# Patient Record
Sex: MALE | Race: WHITE | HISPANIC OR LATINO | Employment: FULL TIME | URBAN - METROPOLITAN AREA
[De-identification: names, ages, dates, MRNs, and addresses within clinical notes are randomized per-mention and may not be internally consistent; named-entity substitution may affect disease eponyms.]

---

## 2019-04-14 ENCOUNTER — OFFICE VISIT (OUTPATIENT)
Dept: URGENT CARE | Facility: PHYSICIAN GROUP | Age: 44
End: 2019-04-14
Payer: COMMERCIAL

## 2019-04-14 VITALS
WEIGHT: 180 LBS | BODY MASS INDEX: 28.25 KG/M2 | TEMPERATURE: 97.6 F | SYSTOLIC BLOOD PRESSURE: 106 MMHG | OXYGEN SATURATION: 98 % | RESPIRATION RATE: 15 BRPM | DIASTOLIC BLOOD PRESSURE: 66 MMHG | HEART RATE: 64 BPM | HEIGHT: 67 IN

## 2019-04-14 DIAGNOSIS — K13.0 MUCOCELE OF LOWER LIP: ICD-10-CM

## 2019-04-16 PROCEDURE — 10060 I&D ABSCESS SIMPLE/SINGLE: CPT | Performed by: PHYSICIAN ASSISTANT

## 2019-04-16 ASSESSMENT — ENCOUNTER SYMPTOMS
FEVER: 0
MYALGIAS: 0
DIZZINESS: 0
PALPITATIONS: 0
NAUSEA: 0
SHORTNESS OF BREATH: 0
SORE THROAT: 0
BLURRED VISION: 0
EYE PAIN: 0
COUGH: 0
HEADACHES: 0
CHILLS: 0
VOMITING: 0

## 2019-04-16 NOTE — PROGRESS NOTES
"Subjective:      Aldo Lares is a 44 y.o. male who presents with Oral Swelling (bump on  lip)      Cyst   This is a new problem. The current episode started more than 1 month ago (First noticed it 2 months ago). The problem occurs constantly. The problem has been gradually worsening. Pertinent negatives include no chest pain, chills, congestion, coughing, fever, headaches, myalgias, nausea, sore throat or vomiting. Nothing aggravates the symptoms. He has tried nothing for the symptoms.   Patient reports that he said some kind of cyst or bump on his lower lip for approximately 2 months.  He reports that it is not painful.  He has not noticed any drainage from it.  He says it has been gradually increasing in size, however, and is causing him to sometimes accidentally bite it.      Review of Systems   Constitutional: Negative for chills, fever and malaise/fatigue.   HENT: Negative for congestion and sore throat.    Eyes: Negative for blurred vision and pain.   Respiratory: Negative for cough and shortness of breath.    Cardiovascular: Negative for chest pain and palpitations.   Gastrointestinal: Negative for nausea and vomiting.   Musculoskeletal: Negative for myalgias.   Skin:        Cyst/bump on lower lip   Neurological: Negative for dizziness and headaches.       PMH:  has no past medical history on file.  MEDS: No current outpatient prescriptions on file.  ALLERGIES: No Known Allergies  SURGHX: No past surgical history on file.  SOCHX:  reports that he has never smoked. He has never used smokeless tobacco.  FH: Family history was reviewed, no pertinent findings to report     Objective:     /66   Pulse 64   Temp 36.4 °C (97.6 °F) (Temporal)   Resp 15   Ht 1.702 m (5' 7\")   Wt 81.6 kg (180 lb)   SpO2 98%   BMI 28.19 kg/m²      Physical Exam   Constitutional: He is oriented to person, place, and time. He appears well-developed and well-nourished.   HENT:   Head: Normocephalic and " atraumatic.   Right Ear: External ear normal.   Left Ear: External ear normal.   Mouth/Throat: Mucous membranes are normal.       Eyes: Pupils are equal, round, and reactive to light. Conjunctivae are normal.   Neck: Normal range of motion.   Cardiovascular: Normal rate, regular rhythm and normal heart sounds.    No murmur heard.  Pulmonary/Chest: Effort normal and breath sounds normal. He has no wheezes.   Lymphadenopathy:     He has no cervical adenopathy.   Neurological: He is alert and oriented to person, place, and time.   Skin: Skin is warm and dry. Capillary refill takes less than 2 seconds.   Psychiatric: He has a normal mood and affect. His behavior is normal. Judgment normal.          Assessment/Plan:     1. Mucocele of lower lip  - REFERRAL TO DERMATOLOGY  -Cyst was drained in the office today.  Explained to patient that he may need follow-up with dermatology as sometimes one drainage is not curative      Differential Diagnosis, natural history, and supportive care discussed. Return to the Urgent Care or follow up with your PCP if symptoms fail to resolve, or for any new or worsening symptoms. Emergency room precautions discussed. Patient and/or family appears understanding of information.

## 2019-04-16 NOTE — PROCEDURES
I and D  Date/Time: 4/16/2019 8:58 AM  Performed by: TRISTEN CESAR  Authorized by: TRISTEN CESAR   Indications for incision and drainage: Mucocele.  Location: Lower lip.    Anesthesia:  Local anesthetic: Not needed.  Needle gauge: 22  Complexity: simple  Drainage characteristics: Clear, gelatinous.  Drainage amount: moderate  Wound treatment: wound left open  Patient tolerance: Patient tolerated the procedure well with no immediate complications

## 2019-06-26 ENCOUNTER — OFFICE VISIT (OUTPATIENT)
Dept: DERMATOLOGY | Facility: IMAGING CENTER | Age: 44
End: 2019-06-26
Payer: COMMERCIAL

## 2019-06-26 VITALS — TEMPERATURE: 97.4 F | HEIGHT: 67 IN | WEIGHT: 180 LBS | BODY MASS INDEX: 28.25 KG/M2

## 2019-06-26 DIAGNOSIS — R20.8 SKIN PAIN: ICD-10-CM

## 2019-06-26 DIAGNOSIS — K13.0 MUCOCELE OF LOWER LIP: ICD-10-CM

## 2019-06-26 PROCEDURE — 99202 OFFICE O/P NEW SF 15 MIN: CPT | Performed by: DERMATOLOGY

## 2019-06-26 ASSESSMENT — ENCOUNTER SYMPTOMS
FEVER: 0
CHILLS: 0

## 2019-06-26 NOTE — PROGRESS NOTES
"Dermatology New Patient Visit    Chief Complaint   Patient presents with   • Skin Lesion       Subjective:     HPI:   Aldo Lares is a 44 y.o. male presenting for    HPI/location: growth inside lower lip  Time present:  3-4 months  Painful lesion: yes, sometimes, in the way - bites it  Itching lesion: No  Enlarging lesion: Yes  Anything make it better or worse? Was popped at urgent care, came back - tried to treat it at home, came back          History reviewed. No pertinent past medical history.    No current outpatient prescriptions on file prior to visit.     No current facility-administered medications on file prior to visit.        No Known Allergies    History reviewed. No pertinent family history.    Social History     Social History   • Marital status:      Spouse name: N/A   • Number of children: N/A   • Years of education: N/A     Occupational History   • Not on file.     Social History Main Topics   • Smoking status: Never Smoker   • Smokeless tobacco: Never Used   • Alcohol use Yes      Comment: Occ   • Drug use: Unknown   • Sexual activity: Not on file     Other Topics Concern   • Not on file     Social History Narrative   • No narrative on file       Review of Systems   Constitutional: Negative for chills and fever.   Skin: Negative for itching and rash.   All other systems reviewed and are negative.       Objective:     A focused cutaneous exam was completed including: hair, ears, face, eyelids, conjunctiva, lips, gums/tongue neck with the following pertinent findings listed below. Remaining above-listed examined areas within normal limits / negative for rashes or lesions.    Temp 36.3 °C (97.4 °F)   Ht 1.702 m (5' 7\")   Wt 81.6 kg (180 lb)     Physical Exam   Constitutional: He is oriented to person, place, and time and well-developed, well-nourished, and in no distress.   HENT:   Head: Normocephalic and atraumatic.   Right Ear: External ear normal.   Left Ear: External ear " normal.   Nose: Nose normal.   Mouth/Throat:       Eyes: Conjunctivae are normal.   Neck: Normal range of motion.   Pulmonary/Chest: Effort normal.   Neurological: He is alert and oriented to person, place, and time.   Skin: Skin is warm and dry.   Psychiatric: Mood and affect normal.   Vitals reviewed.      DATA: none applicable to review    Assessment and Plan:     1. Mucocele of lower lip, +intermittent pain  - patient to make appointment for procedure per his schedule    Followup: Return for procedure.    Kacie Tyson M.D.

## 2019-08-07 ENCOUNTER — OFFICE VISIT (OUTPATIENT)
Dept: DERMATOLOGY | Facility: IMAGING CENTER | Age: 44
End: 2019-08-07
Payer: COMMERCIAL

## 2019-08-07 VITALS
BODY MASS INDEX: 28.25 KG/M2 | DIASTOLIC BLOOD PRESSURE: 60 MMHG | TEMPERATURE: 97.6 F | WEIGHT: 180 LBS | HEIGHT: 67 IN | SYSTOLIC BLOOD PRESSURE: 108 MMHG

## 2019-08-07 DIAGNOSIS — R20.8 SKIN PAIN: ICD-10-CM

## 2019-08-07 DIAGNOSIS — K13.0 MUCOCELE OF LIP: ICD-10-CM

## 2019-08-07 PROCEDURE — 11441 EXC FACE-MM B9+MARG 0.6-1 CM: CPT | Performed by: DERMATOLOGY

## 2019-08-07 NOTE — PROGRESS NOTES
"EXCISION PROCEDURE NOTE:    Removal of mucocele 2/2 biting/skin pain. Risks, benefits and alternatives of procedure, including, but not limited to scar/poor cosmetic outcome, bleeding, pain, infection, nerve damage, recurrence of lesion, failed surgery, and need for further surgery, were discussed and written informed consent obtained. Verbal time out completed.     Allergies reviewed: No  Pacemaker/defibrillator: No  Artificial joints: No  Antibiotics given: No  Blood thinners/anticoagulants: No    Pre-op diagnosis: mucocele  Post-op diagnosis: Same  Site:lower lip  Pre-op size:10mm    /60   Temp 36.4 °C (97.6 °F)   Ht 1.702 m (5' 7\")   Wt 81.6 kg (180 lb)     Procedure: Area of surgery was prepped with alcohol, marked with a sterile marking pen. Anesthesia with 1% lidocaine with epinephrine administered with 30 gauge needle. The area was again cleaned with alcohol swab. With sterile technique, a 15 blade scalpel was used to make an elliptical incision over the lesion to the level of the subcutaneous fat. Dissection was completed with iris/tissue scissors, and the mass was removed. Hemostasis was achieved with pressure.  Specimen was placed into biopsy container and sent to pathology by staff.    Simple closure note:  5.0 superficial interrupted sutures x 3 were placed to approximate wound edge.  Vaseline applied to wound with bandage. Patient tolerated procedure well and there were no complications, blood loss was minimal.     Final wound size: ~9mm    Bandage was placed with vaseline, telfa, gauze and tape. Wound care was discussed with the patient, and written instructions were provided. Patient to return to clinic in 5 days for suture removal. Patient to call us if any problems or concerns with the procedure site arise prior to scheduled appointment.    Kacie Tyson M.D.      "

## 2019-08-13 ENCOUNTER — NON-PROVIDER VISIT (OUTPATIENT)
Dept: DERMATOLOGY | Facility: IMAGING CENTER | Age: 44
End: 2019-08-13
Payer: COMMERCIAL

## 2019-08-14 NOTE — PROGRESS NOTES
Aldo Lares is a 44 y.o. male here for a Non-Provider Visit for Suture Removal.    Sutures were placed by Dr. Tyson  on date: 08/7/2019   Skin is healed: Yes  Provider notified if skin is not healed, or if there is redness, heat, pain, or drainage from incision: yes and N\A  Well healed   Sutures removed.   Mastisol and steristips are placed: No    Advised to use emollient (vaseline, aquaphor, etc.) as needed, avoid peroxide and antibiotic ointment to reduce irritation.     Path report has been reviewed by provider.  Path report has reviewed with patient.